# Patient Record
Sex: MALE | Race: WHITE | NOT HISPANIC OR LATINO
[De-identification: names, ages, dates, MRNs, and addresses within clinical notes are randomized per-mention and may not be internally consistent; named-entity substitution may affect disease eponyms.]

---

## 2022-09-14 ENCOUNTER — APPOINTMENT (OUTPATIENT)
Dept: VASCULAR SURGERY | Facility: CLINIC | Age: 52
End: 2022-09-14

## 2022-09-14 VITALS
SYSTOLIC BLOOD PRESSURE: 136 MMHG | HEART RATE: 59 BPM | DIASTOLIC BLOOD PRESSURE: 91 MMHG | HEIGHT: 70 IN | BODY MASS INDEX: 24.62 KG/M2 | WEIGHT: 172 LBS

## 2022-09-14 DIAGNOSIS — Z78.9 OTHER SPECIFIED HEALTH STATUS: ICD-10-CM

## 2022-09-14 DIAGNOSIS — I10 ESSENTIAL (PRIMARY) HYPERTENSION: ICD-10-CM

## 2022-09-14 PROCEDURE — 99204 OFFICE O/P NEW MOD 45 MIN: CPT

## 2022-09-14 PROCEDURE — 93926 LOWER EXTREMITY STUDY: CPT

## 2022-09-14 PROCEDURE — 93971 EXTREMITY STUDY: CPT

## 2022-09-15 PROBLEM — I10 HYPERTENSION, UNSPECIFIED TYPE: Status: ACTIVE | Noted: 2022-09-14

## 2022-09-15 PROBLEM — Z78.9 SOCIAL ALCOHOL USE: Status: ACTIVE | Noted: 2022-09-14

## 2022-09-15 RX ORDER — APIXABAN 5 MG/1
5 TABLET, FILM COATED ORAL
Refills: 0 | Status: ACTIVE | COMMUNITY

## 2022-09-15 RX ORDER — LOSARTAN POTASSIUM AND HYDROCHLOROTHIAZIDE 25; 100 MG/1; MG/1
100-25 TABLET ORAL
Refills: 0 | Status: ACTIVE | COMMUNITY

## 2022-09-17 NOTE — PHYSICAL EXAM
[Respiratory Effort] : normal respiratory effort [0] : left 0 [Normal Rate and Rhythm] : normal rate and rhythm [2+] : right 2+ [Ankle Swelling (On Exam)] : present [Ankle Swelling On The Left] : of the left ankle [Ankle Swelling On The Right] : mild [Alert] : alert [Anxious] : anxious [Varicose Veins Of Lower Extremities] : not present [] : not present [Abdomen Tenderness] : ~T ~M No abdominal tenderness [de-identified] : WN/WD, NAD [de-identified] : NC/AT [de-identified] : supple [de-identified] : +FROM 5/5x4 [de-identified] : grossly intact

## 2022-09-17 NOTE — ADDENDUM
[FreeTextEntry1] : This note was written by Prabha MCINTOSH, acting as a scribe for Dr. True Benjamin.  I, Dr. True Benjamin, have read and attest that all the information, medical decision-making, and discharge instructions within are true and accurate.\par \par I, Dr. True Benjamin, personally performed the evaluation and management (E/M) services for this new patient.  That E/M includes conducting the initial examination, assessing all conditions, and establishing the plan of care.  Today, my ACP, Prabha MCINTOSH, was here to observe my evaluation and management services for this patient to be followed going forward.\par \par \par

## 2022-09-17 NOTE — ASSESSMENT
[Arterial/Venous Disease] : arterial/venous disease [Medication Management] : medication management [FreeTextEntry1] : 51 y/o M, former smoker with HTN who is referred by a patient of Andi cuba  for an evaluation of pain in left leg.\par Patient developed extensive L leg unprovoked DVT  in August, and he was evaluated at [facility] where he underwent venous thrombectomy and iliac stent placement for ?May Thurner syndrome. Patient is on Eliquis, wears compression stockings, however he continues to experience pain in his leg that precludes him from walking distances that he used to walk before or playing tennis. He went back to see his vascular surgeon who performed the procedure and they suggested that he might have a leg ischemia and recommended for him to get urgent CT scan. Patient is seeking a second opinion since he is confused why he has an ischemia of his leg now.\par Mild LLE edema, both legs are well perfused, palpable L femoral, popliteal pulses, diminished DP/PT due to ankle edema.\par LLE Venous US ordered today that was done in the office demonstrated: Common iliac vein patent stent. L acute DVT in FV and one of the paired peroneal veins thrombosed.\par Left arterial duplex showed patent arterial system with no evidence of flow restrictive atherosclerotic lesions at this time.\par We discussed the findings and reassured the patient and his spouse that his symptoms are within normal limits after having extensive left leg DVT and he doesn't have any arterial insufficiency at this time.\par He was recommended to stay on Eliquis, continue being active, walk daily and wear 15-20 mmHg compression stockings over his left lower extremity. We explained to the patient that his symptoms will resolve over time and \par he may take Advil if needed.\par F/u in 1 month.

## 2022-09-17 NOTE — HISTORY OF PRESENT ILLNESS
[FreeTextEntry1] : 51 y/o M, former smoker with HTN who is referred by a patient of Andi cuba  for an evaluation of pain in left leg.\par Patient developed extensive L leg unprovoked DVT  in August, and he was evaluated at [facility] where he underwent venous thrombectomy and iliac stent placement for ?May Thurner syndrome. Patient is on Eliquis, wears compression stockings, however he continues to experience pain in his leg that precludes him from walking distances that he used to walk before or playing tennis. He went back to see his vascular surgeon who performed the procedure and they suggested that he might have a leg ischemia and recommended for him to get urgent CT scan. Patient is seeking a second opinion since he is confused why he has an ischemia of his leg now. \par He denies rest pain, previous hx of claudication, hx of DVT, CP, SOB.\par \par He works remotely from home as an .\par

## 2022-09-17 NOTE — PROCEDURE
[FreeTextEntry1] : LLE Venous US ordered today that was done in the office demonstrated: Common iliac vein patent stent. L acute DVT in FV and one of the paired peroneal veins thrombosed.\par Left arterial duplex showed patent arterial system with no evidence of flow restrictive atherosclerotic lesions at this time.\par

## 2022-09-17 NOTE — REVIEW OF SYSTEMS
[Negative] : Heme/Lymph [As noted in HPI] : as noted in HPI [Lower Ext Edema] : lower extremity edema [As Noted in HPI] : as noted in HPI [Limb Pain] : limb pain [Limb Swelling] : limb swelling [Chest Pain] : no chest pain [Leg Claudication] : no intermittent leg claudication [Shortness Of Breath] : no shortness of breath

## 2022-10-12 ENCOUNTER — APPOINTMENT (OUTPATIENT)
Dept: VASCULAR SURGERY | Facility: CLINIC | Age: 52
End: 2022-10-12

## 2022-10-12 VITALS
BODY MASS INDEX: 24.62 KG/M2 | HEIGHT: 70 IN | HEART RATE: 81 BPM | WEIGHT: 172 LBS | DIASTOLIC BLOOD PRESSURE: 79 MMHG | SYSTOLIC BLOOD PRESSURE: 121 MMHG

## 2022-10-12 PROCEDURE — 93971 EXTREMITY STUDY: CPT

## 2022-10-12 PROCEDURE — 99213 OFFICE O/P EST LOW 20 MIN: CPT

## 2022-10-12 NOTE — PHYSICAL EXAM
[Respiratory Effort] : normal respiratory effort [Normal Rate and Rhythm] : normal rate and rhythm [Alert] : alert [Anxious] : anxious [2+] : left 2+ [Ankle Swelling (On Exam)] : not present [Varicose Veins Of Lower Extremities] : not present [] : not present [Abdomen Tenderness] : ~T ~M No abdominal tenderness [de-identified] : WN/WD, NAD [de-identified] : NC/AT [de-identified] : supple [de-identified] : +FROM 5/5x4 [de-identified] : grossly intact

## 2022-10-12 NOTE — PROCEDURE
[FreeTextEntry1] : LE Venous US ordered today to assess DVT reveals L acute DVT in FV with minimal recanalization noted. \par

## 2022-10-12 NOTE — ADDENDUM
[FreeTextEntry1] : I, Dr. True Benjamin, personally performed the evaluation and management (E/M) services for this established patient who presents today with (an) existing condition(s).  That E/M includes conducting the examination, assessing all conditions, and (re)establishing/reinforcing a plan of care.  Today, my ACP, Prabha MCINTOSH, was here to observe my evaluation and management services for this condition to be followed going forward.\par \par The documentation for this encounter was entered by Thom Grant acting as a scribe for Dr.Gary Benjamin.\par \par

## 2022-10-12 NOTE — ASSESSMENT
[Arterial/Venous Disease] : arterial/venous disease [Medication Management] : medication management [FreeTextEntry1] : 51 y/o M, former smoker with HTN who is referred by a patient of Andi cuba  for an evaluation of pain in left leg. Patient with extensive L leg unprovoked DVT  in August, and he was evaluated at [facility] where he underwent venous thrombectomy and iliac stent placement for ?May Thurner syndrome. Patient is on Eliquis, wears compression stockings. He was seen for 2nd opinion a month ago, venous doppler was done at that time demonstrating Common iliac vein patent stent. L acute DVT in FV and one of the paired peroneal veins thrombosed. He returns today for a follow up.\par Mild LLE edema, both legs are well perfused, palpable L femoral, popliteal pulses, palpable DP/PT pulses.\par LLE Venous US ordered today that was done in the office demonstrated:. L acute DVT in FV with minimal recanalization noted. \par Recommended to stay on Eliquis. Pt should c/w compression stockings and stay active. \par FU in 2-3 months.\par

## 2022-10-12 NOTE — HISTORY OF PRESENT ILLNESS
[FreeTextEntry1] : 53 y/o M, former smoker with HTN who is referred by a patient of Andi cuba  for an evaluation of pain in left leg. Patient with extensive L leg unprovoked DVT  in August, and he was evaluated at [facility] where he underwent venous thrombectomy and iliac stent placement for ?May Thurner syndrome. Patient is on Eliquis, wears compression stockings. He was seen for 2nd opinion a month ago, venous doppler was done at that time demonstrating Common iliac vein patent stent. L acute DVT in FV and one of the paired peroneal veins thrombosed. He returns today for a follow up. He has been walking with pain from the L inner thigh which has slightly improved since last visit.\par He still experiences mild edema and pain in his left leg, denies claudication, hx of DVT, CP, SOB. He wears thigh high compression stocking and he resumed his activities, played tennis last weekend.\par \par He works remotely from home as an .\par

## 2022-10-12 NOTE — REVIEW OF SYSTEMS
[As noted in HPI] : as noted in HPI [Lower Ext Edema] : lower extremity edema [As Noted in HPI] : as noted in HPI [Limb Pain] : limb pain [Limb Swelling] : limb swelling [Negative] : Heme/Lymph [Chest Pain] : no chest pain [Leg Claudication] : no intermittent leg claudication [Shortness Of Breath] : no shortness of breath

## 2022-10-27 ENCOUNTER — NON-APPOINTMENT (OUTPATIENT)
Age: 52
End: 2022-10-27

## 2022-12-14 ENCOUNTER — APPOINTMENT (OUTPATIENT)
Dept: VASCULAR SURGERY | Facility: CLINIC | Age: 52
End: 2022-12-14

## 2022-12-14 VITALS
HEIGHT: 70 IN | BODY MASS INDEX: 24.62 KG/M2 | SYSTOLIC BLOOD PRESSURE: 122 MMHG | HEART RATE: 75 BPM | DIASTOLIC BLOOD PRESSURE: 86 MMHG | WEIGHT: 172 LBS

## 2022-12-14 PROCEDURE — 93971 EXTREMITY STUDY: CPT

## 2022-12-14 PROCEDURE — 99213 OFFICE O/P EST LOW 20 MIN: CPT

## 2022-12-29 NOTE — ASSESSMENT
[Arterial/Venous Disease] : arterial/venous disease [Medication Management] : medication management [FreeTextEntry1] : 53 y/o M with extensive L leg unprovoked DVT  in August, and he was evaluated at [facility] where he underwent venous thrombectomy and iliac stent placement for ?May Thurner syndrome. Patient is on Eliquis, wears compression stockings. He was seen for 2nd opinion Sept 2022, venous doppler was done at that time demonstrating Common iliac vein patent stent. L acute DVT in FV and one of the paired peroneal veins thrombosed. He returns today for a follow up.\par \par Mild LLE edema, both legs are well perfused, palpable L femoral, popliteal pulses, palpable DP/PT pulses.\par \par LLE Venous US today showed   L chronic DVT with some flow. \par \par Recommended to stay on Eliquis. Pt should c/w compression stockings and stay active. Complete hypercoag studies. Colonoscopy pending and CXR as part of malignancy workup and there is no clear etiology of the DVT> \par FU in April.\par

## 2022-12-29 NOTE — ADDENDUM
[FreeTextEntry1] : I, Dr. True Benjamin, personally performed the evaluation and management (E/M) services for this established patient who presents today with (a) new problem(s)/exacerbation of (an) existing condition(s).  That E/M includes conducting the examination, assessing all new/exacerbated conditions, and establishing a new plan of care.  Today, my ACP, Kylie Renner NP, was here to observe my evaluation and management services for this new problem/exacerbated condition to be followed going forward. \par

## 2022-12-29 NOTE — HISTORY OF PRESENT ILLNESS
[FreeTextEntry1] : 53 y/o M, former smoker with HTN and extensive L leg unprovoked DVT  in August, and he was evaluated at [facility] where he underwent venous thrombectomy and iliac stent placement for ?May Thurner syndrome. Patient is on Eliquis, wears compression stockings. He was seen for 2nd opinion Sept 2022, venous doppler was done at that time demonstrating Common iliac vein patent stent. L acute DVT in FV and one of the paired peroneal veins thrombosed. \par \par Today, he returns today for a follow up. Denies any CP, SOB. He wears thigh high compression stocking and he resumed his activities.\par \par He works remotely from home as an .\par

## 2022-12-29 NOTE — PHYSICAL EXAM
[Respiratory Effort] : normal respiratory effort [Normal Rate and Rhythm] : normal rate and rhythm [2+] : left 2+ [Alert] : alert [Anxious] : anxious [Ankle Swelling (On Exam)] : not present [Varicose Veins Of Lower Extremities] : not present [] : not present [Abdomen Tenderness] : ~T ~M No abdominal tenderness [de-identified] : WN/WD, NAD [de-identified] : NC/AT [de-identified] : supple [de-identified] : +FROM 5/5x4 [de-identified] : grossly intact

## 2023-02-15 ENCOUNTER — APPOINTMENT (OUTPATIENT)
Dept: VASCULAR SURGERY | Facility: CLINIC | Age: 53
End: 2023-02-15
Payer: COMMERCIAL

## 2023-02-15 VITALS
HEIGHT: 70 IN | SYSTOLIC BLOOD PRESSURE: 129 MMHG | WEIGHT: 172 LBS | DIASTOLIC BLOOD PRESSURE: 86 MMHG | BODY MASS INDEX: 24.62 KG/M2 | HEART RATE: 87 BPM

## 2023-02-15 DIAGNOSIS — I82.412 ACUTE EMBOLISM AND THROMBOSIS OF LEFT FEMORAL VEIN: ICD-10-CM

## 2023-02-15 PROCEDURE — 99213 OFFICE O/P EST LOW 20 MIN: CPT

## 2023-02-15 PROCEDURE — 93971 EXTREMITY STUDY: CPT

## 2023-02-21 NOTE — PROCEDURE
[FreeTextEntry1] : LE Venous US ordered today to assess DVT reveals: L chronic DVT with some flow. Common iliac patent stent. All other vein patent. No significant changes

## 2023-02-21 NOTE — ASSESSMENT
[Arterial/Venous Disease] : arterial/venous disease [Medication Management] : medication management [FreeTextEntry1] : 51 y/o M with extensive L leg unprovoked DVT  in August, and he was evaluated at [facility] where he underwent venous thrombectomy and iliac stent placement for ?May Thurner syndrome. Patient is on Eliquis, wears compression stockings. He was seen for 2nd opinion Sept 2022, venous doppler was done at that time demonstrating Common iliac vein patent stent. L acute DVT in FV and one of the paired peroneal veins thrombosed. He returns today for a follow up.\par \par Mild LLE edema, both legs are well perfused, palpable L femoral, popliteal pulses, palpable DP/PT pulses.\par \par LLE Venous US today showed  L chronic DVT with some flow. Patent iliac stent. No significant changes.\par \par Recommended to c/w Eliquis. Pt should c/w compression stockings, knee high if needed and stay active. Pending hypercoag studies to determine AC therapy length treatment. Colonoscopy pending and CXR as part of malignancy workup and there is no clear etiology of the DVT. Pt reassured stable study. \par Pt will forward heme workup studies and notes. Will determine when pt needs to come back tot he office.

## 2023-02-21 NOTE — PHYSICAL EXAM
[Respiratory Effort] : normal respiratory effort [Normal Rate and Rhythm] : normal rate and rhythm [2+] : left 2+ [Alert] : alert [Anxious] : anxious [Ankle Swelling (On Exam)] : not present [Varicose Veins Of Lower Extremities] : not present [] : not present [Abdomen Tenderness] : ~T ~M No abdominal tenderness [de-identified] : WN/WD, NAD [de-identified] : NC/AT [de-identified] : supple [de-identified] : +FROM 5/5x4 [de-identified] : grossly intact

## 2023-02-21 NOTE — HISTORY OF PRESENT ILLNESS
[FreeTextEntry1] : 53 y/o M, former smoker with HTN and extensive L leg unprovoked DVT  in August, and he was evaluated at [facility] where he underwent venous thrombectomy and iliac stent placement for ?May Thurner syndrome. Patient is on Eliquis, wears compression stockings. He was seen for 2nd opinion Sept 2022, venous doppler was done at that time demonstrating Common iliac vein patent stent. L acute DVT in FV and one of the paired peroneal veins thrombosed. \par \par Today, he returns today for a follow up. Denies any CP, SOB. He wears thigh high compression stocking and he resumed his activities. He reports slightly improvement in LE symptoms, still experiences leg heaviness with mild swelling 1-2x week. Heme work up completed (per pt no findings however, no reports available). Colonoscopy also given they wanted to wait until Eliquis can be placed on hold.\par \par \par He works remotely from home as an .\par

## 2023-08-24 ENCOUNTER — NON-APPOINTMENT (OUTPATIENT)
Age: 53
End: 2023-08-24

## 2024-01-12 ENCOUNTER — APPOINTMENT (OUTPATIENT)
Dept: VASCULAR SURGERY | Facility: CLINIC | Age: 54
End: 2024-01-12
Payer: COMMERCIAL

## 2024-01-12 VITALS
HEIGHT: 70 IN | WEIGHT: 172 LBS | HEART RATE: 79 BPM | DIASTOLIC BLOOD PRESSURE: 87 MMHG | BODY MASS INDEX: 24.62 KG/M2 | SYSTOLIC BLOOD PRESSURE: 124 MMHG

## 2024-01-12 DIAGNOSIS — I82.509 CHRONIC EMBOLISM AND THROMBOSIS OF UNSPECIFIED DEEP VEINS OF UNSPECIFIED LOWER EXTREMITY: ICD-10-CM

## 2024-01-12 DIAGNOSIS — Z79.01 LONG TERM (CURRENT) USE OF ANTICOAGULANTS: ICD-10-CM

## 2024-01-12 DIAGNOSIS — Z87.891 PERSONAL HISTORY OF NICOTINE DEPENDENCE: ICD-10-CM

## 2024-01-12 DIAGNOSIS — M79.89 OTHER SPECIFIED SOFT TISSUE DISORDERS: ICD-10-CM

## 2024-01-12 PROCEDURE — 99213 OFFICE O/P EST LOW 20 MIN: CPT

## 2024-01-12 PROCEDURE — 93971 EXTREMITY STUDY: CPT

## 2024-01-29 PROBLEM — I82.509 CHRONIC DEEP VEIN THROMBOSIS (DVT): Status: ACTIVE | Noted: 2024-01-29

## 2024-01-29 PROBLEM — Z87.891 FORMER SMOKER: Status: ACTIVE | Noted: 2022-09-14

## 2024-01-29 PROBLEM — M79.89 LEG SWELLING: Status: ACTIVE | Noted: 2022-09-14

## 2024-01-29 PROBLEM — Z79.01 ANTICOAGULATED: Status: ACTIVE | Noted: 2024-01-29

## 2024-01-29 NOTE — HISTORY OF PRESENT ILLNESS
[FreeTextEntry1] : 54 y/o M, former smoker with HTN and extensive L leg unprovoked DVT in August 2022, and he was evaluated at [facility] where he underwent venous thrombectomy and iliac stent placement for ?May Thurner syndrome. Patient is on Eliquis since then, wears compression stockings (not daily). He was seen for 2nd opinion Sept 2022 at our office and venous doppler was done at that time demonstrating common iliac vein patent stent. L acute DVT in FV and one of the paired peroneal veins thrombosed. CXR, colonoscopy were WNL. Hematological work-up was not conclusive.   Today, he returns today for a follow up. Denies any CP, SOB. He reports slightly improvement in LE symptoms, still experiences leg heaviness with mild swelling.    He works remotely from home as an .

## 2024-01-29 NOTE — REVIEW OF SYSTEMS
[As noted in HPI] : as noted in HPI [Chest Pain] : no chest pain [Leg Claudication] : no intermittent leg claudication [Lower Ext Edema] : lower extremity edema [Shortness Of Breath] : no shortness of breath [As Noted in HPI] : as noted in HPI [Limb Pain] : limb pain [Limb Swelling] : limb swelling [Negative] : Heme/Lymph

## 2024-01-29 NOTE — PHYSICAL EXAM
[Respiratory Effort] : normal respiratory effort [Normal Rate and Rhythm] : normal rate and rhythm [2+] : left 2+ [Ankle Swelling (On Exam)] : not present [Varicose Veins Of Lower Extremities] : not present [] : not present [Abdomen Tenderness] : ~T ~M No abdominal tenderness [No Rash or Lesion] : No rash or lesion [Alert] : alert [Oriented to Person] : oriented to person [Oriented to Place] : oriented to place [Oriented to Time] : oriented to time [Anxious] : anxious [de-identified] : WN/WD [de-identified] : +FROM 5/5x4 [de-identified] : grossly intact

## 2024-01-29 NOTE — PROCEDURE
[FreeTextEntry1] : LLE Venous US ordered today to assess DVT reveals: L chronic DVT with recanalization in femoral vein. All other veins patent. No significant changes.

## 2024-01-29 NOTE — ASSESSMENT
[FreeTextEntry1] : 54 y/o M with extensive L leg unprovoked DVT in August 2022, and he was evaluated at [facility] where he underwent venous thrombectomy and iliac stent placement for ?May Thurner syndrome. Patient has been on Eliquis, wears compression stockings occasionally. He was seen for 2nd opinion Sept 2022, venous doppler was done at that time demonstrating Common iliac vein patent stent. L acute DVT in FV and one of the paired peroneal veins thrombosed. At f/u appts, DVT is now chronic with some recanalization. CXR and colonoscopy WNL. Heme workup inconclusive. He returns today for a follow up.  Mild LLE edema when compared to right side. Both legs are well perfused, palpable L femoral, popliteal pulses, palpable DP/PT pulses.  LLE Venous US today showed L chronic DVT with recanalization in femoral vein. All other veins patent. No significant changes.  Recommended to c/w Eliquis. Pt should c/w compression stockings and stay active. Discussed if he wants to have a new hematological opinion, this will determine AC therapy length treatment (long term life vs switching to antiplatelet). Pt reassured stable study and exam findings. He will contact the office for hematological referral. Given iliac stent also in place and unclear etiology of extensive, original clot, Eliquis recommended to continue and not stop. F/u in 1 yr or as needed.  [Arterial/Venous Disease] : arterial/venous disease [Medication Management] : medication management